# Patient Record
Sex: FEMALE | Race: OTHER | HISPANIC OR LATINO | URBAN - METROPOLITAN AREA
[De-identification: names, ages, dates, MRNs, and addresses within clinical notes are randomized per-mention and may not be internally consistent; named-entity substitution may affect disease eponyms.]

---

## 2023-10-26 ENCOUNTER — EMERGENCY (EMERGENCY)
Facility: HOSPITAL | Age: 23
LOS: 1 days | Discharge: HOME | End: 2023-10-26
Attending: EMERGENCY MEDICINE | Admitting: EMERGENCY MEDICINE
Payer: SELF-PAY

## 2023-10-26 VITALS
HEART RATE: 94 BPM | SYSTOLIC BLOOD PRESSURE: 100 MMHG | TEMPERATURE: 99 F | RESPIRATION RATE: 16 BRPM | DIASTOLIC BLOOD PRESSURE: 55 MMHG | OXYGEN SATURATION: 97 %

## 2023-10-26 PROCEDURE — 99053 MED SERV 10PM-8AM 24 HR FAC: CPT

## 2023-10-26 PROCEDURE — 99284 EMERGENCY DEPT VISIT MOD MDM: CPT

## 2023-10-26 PROCEDURE — 93010 ELECTROCARDIOGRAM REPORT: CPT

## 2023-10-26 NOTE — ED ADULT TRIAGE NOTE - CHIEF COMPLAINT QUOTE
pt c/o palpitations x 4 days. also c/o abdominal pain, n/v, cough, chills, chest pain, headache and dizziness beginning 1 week ago. appears to be in no distress. hx. asthma

## 2023-10-27 VITALS
RESPIRATION RATE: 21 BRPM | HEART RATE: 72 BPM | TEMPERATURE: 98 F | DIASTOLIC BLOOD PRESSURE: 62 MMHG | SYSTOLIC BLOOD PRESSURE: 101 MMHG | OXYGEN SATURATION: 100 %

## 2023-10-27 LAB
APPEARANCE UR: ABNORMAL
APPEARANCE UR: ABNORMAL
BACTERIA # UR AUTO: ABNORMAL /HPF
BACTERIA # UR AUTO: ABNORMAL /HPF
BILIRUB UR-MCNC: NEGATIVE — SIGNIFICANT CHANGE UP
BILIRUB UR-MCNC: NEGATIVE — SIGNIFICANT CHANGE UP
CAST: 1 /LPF — SIGNIFICANT CHANGE UP (ref 0–4)
CAST: 1 /LPF — SIGNIFICANT CHANGE UP (ref 0–4)
COLOR SPEC: SIGNIFICANT CHANGE UP
COLOR SPEC: SIGNIFICANT CHANGE UP
DIFF PNL FLD: NEGATIVE — SIGNIFICANT CHANGE UP
DIFF PNL FLD: NEGATIVE — SIGNIFICANT CHANGE UP
FLUAV AG NPH QL: SIGNIFICANT CHANGE UP
FLUAV AG NPH QL: SIGNIFICANT CHANGE UP
FLUBV AG NPH QL: SIGNIFICANT CHANGE UP
FLUBV AG NPH QL: SIGNIFICANT CHANGE UP
GLUCOSE UR QL: NEGATIVE MG/DL — SIGNIFICANT CHANGE UP
GLUCOSE UR QL: NEGATIVE MG/DL — SIGNIFICANT CHANGE UP
KETONES UR-MCNC: 80 MG/DL
KETONES UR-MCNC: 80 MG/DL
LEUKOCYTE ESTERASE UR-ACNC: NEGATIVE — SIGNIFICANT CHANGE UP
LEUKOCYTE ESTERASE UR-ACNC: NEGATIVE — SIGNIFICANT CHANGE UP
NITRITE UR-MCNC: NEGATIVE — SIGNIFICANT CHANGE UP
NITRITE UR-MCNC: NEGATIVE — SIGNIFICANT CHANGE UP
PH UR: 6 — SIGNIFICANT CHANGE UP (ref 5–8)
PH UR: 6 — SIGNIFICANT CHANGE UP (ref 5–8)
PROT UR-MCNC: 30 MG/DL
PROT UR-MCNC: 30 MG/DL
RBC CASTS # UR COMP ASSIST: 3 /HPF — SIGNIFICANT CHANGE UP (ref 0–4)
RBC CASTS # UR COMP ASSIST: 3 /HPF — SIGNIFICANT CHANGE UP (ref 0–4)
RSV RNA NPH QL NAA+NON-PROBE: SIGNIFICANT CHANGE UP
RSV RNA NPH QL NAA+NON-PROBE: SIGNIFICANT CHANGE UP
SARS-COV-2 RNA SPEC QL NAA+PROBE: SIGNIFICANT CHANGE UP
SARS-COV-2 RNA SPEC QL NAA+PROBE: SIGNIFICANT CHANGE UP
SP GR SPEC: 1.04 — HIGH (ref 1–1.03)
SP GR SPEC: 1.04 — HIGH (ref 1–1.03)
SQUAMOUS # UR AUTO: 8 /HPF — HIGH (ref 0–5)
SQUAMOUS # UR AUTO: 8 /HPF — HIGH (ref 0–5)
UROBILINOGEN FLD QL: 1 MG/DL — SIGNIFICANT CHANGE UP (ref 0.2–1)
UROBILINOGEN FLD QL: 1 MG/DL — SIGNIFICANT CHANGE UP (ref 0.2–1)
WBC UR QL: 3 /HPF — SIGNIFICANT CHANGE UP (ref 0–5)
WBC UR QL: 3 /HPF — SIGNIFICANT CHANGE UP (ref 0–5)

## 2023-10-27 RX ORDER — ACETAMINOPHEN 500 MG
650 TABLET ORAL ONCE
Refills: 0 | Status: COMPLETED | OUTPATIENT
Start: 2023-10-27 | End: 2023-10-27

## 2023-10-27 RX ORDER — ONDANSETRON 8 MG/1
4 TABLET, FILM COATED ORAL ONCE
Refills: 0 | Status: DISCONTINUED | OUTPATIENT
Start: 2023-10-27 | End: 2023-10-27

## 2023-10-27 RX ORDER — ONDANSETRON 8 MG/1
4 TABLET, FILM COATED ORAL ONCE
Refills: 0 | Status: COMPLETED | OUTPATIENT
Start: 2023-10-27 | End: 2023-10-27

## 2023-10-27 RX ORDER — ALBUTEROL 90 UG/1
2 AEROSOL, METERED ORAL ONCE
Refills: 0 | Status: COMPLETED | OUTPATIENT
Start: 2023-10-27 | End: 2023-10-27

## 2023-10-27 RX ORDER — ONDANSETRON 8 MG/1
1 TABLET, FILM COATED ORAL
Qty: 15 | Refills: 0
Start: 2023-10-27 | End: 2023-10-31

## 2023-10-27 RX ORDER — IBUPROFEN 200 MG
400 TABLET ORAL ONCE
Refills: 0 | Status: COMPLETED | OUTPATIENT
Start: 2023-10-27 | End: 2023-10-27

## 2023-10-27 RX ADMIN — ONDANSETRON 4 MILLIGRAM(S): 8 TABLET, FILM COATED ORAL at 01:54

## 2023-10-27 RX ADMIN — ALBUTEROL 2 PUFF(S): 90 AEROSOL, METERED ORAL at 01:53

## 2023-10-27 RX ADMIN — Medication 650 MILLIGRAM(S): at 01:52

## 2023-10-27 RX ADMIN — Medication 400 MILLIGRAM(S): at 01:53

## 2023-10-27 NOTE — ED ADULT NURSE NOTE - OBJECTIVE STATEMENT
22 year old female, received to spot 24A. Pt A&Ox4, ambulatory. Respirations equal and unlabored. Past medical history of asthma. Pt c/o palpitations x4 days. Pt states she has had a cough x3 weeks with intermittent dizziness, SOB, and nausea. Pt denies chest pain, blurry vision, headache, numbness, tingling, any other complaints. Neuro intact. PERRLA. Skin dry and intact. Pt placed on cardiac monitor, normal sinus. EKG performed and placed in chart. UCG performed. Medicated as per EMR orders. No acute distress noted. Pending lab results.

## 2023-10-27 NOTE — ED PROVIDER NOTE - NSFOLLOWUPINSTRUCTIONS_ED_ALL_ED_FT
You were seen in the Emergency Department for palpitations, nausea vomiting, diarrhea.  Urine did not show an acute UTI.  You were also negative for flu, COVID, RSV.  It is likely that your symptoms are still due to a viral illness.  Please follow-up with your primary care doctor in a week.  You can use your inhaler as needed for difficulty breathing.    1) Advance activity as tolerated.   2) Continue all previously prescribed medications as directed.    3) Follow up with your primary care physician in a week- take copies of your results.    4) Return to the Emergency Department for worsening or persistent symptoms, and/or ANY NEW OR CONCERNING SYMPTOMS.    Lo atendieron en Urgencias por palpitaciones, náuseas, vómitos, diarrea. La orina no mostró nilda ITU aguda. También resultó negativo para gripe, COVID, RSV. Es probable que michelle síntomas aún se deban a nilda enfermedad viral. Flori un seguimiento con mills médico de atención primaria en nilda semana. Puede usar mills inhalador según sea necesario si tiene dificultad para respirar.    1) Avanzar en la actividad según se tolere.  2) Continúe con todos los medicamentos recetados anteriormente según las indicaciones.  3) Flori un seguimiento con mills médico de atención primaria en nilda semana; tome copias de michelle resultados.  4) Regresar al Departamento de Emergencias si los síntomas empeoran o persisten, y/o CUALQUIER SÍNTOMA NUEVO O PREOCUPANTE.

## 2023-10-27 NOTE — ED ADULT NURSE NOTE - NSFALLUNIVINTERV_ED_ALL_ED
Bed/Stretcher in lowest position, wheels locked, appropriate side rails in place/Call bell, personal items and telephone in reach/Instruct patient to call for assistance before getting out of bed/chair/stretcher/Non-slip footwear applied when patient is off stretcher/[place] to call system/Physically safe environment - no spills, clutter or unnecessary equipment/Purposeful proactive rounding/Room/bathroom lighting operational, light cord in reach

## 2023-10-27 NOTE — ED PROVIDER NOTE - ATTENDING CONTRIBUTION TO CARE
22-year-old female history of asthma, normal steroids presenting with 4 days of subjective fevers, cough, nausea, abdominal discomfort, vomiting and diarrhea.  Patient reports generalized abdominal discomfort, worse with episodes of vomiting.  Also with nonbloody diarrhea.  Cough is productive of yellow phlegm, but no associated chest pain or shortness of breath.  No recent sick contacts or known travel.    Well appearing, lying in stretcher, awake and alert, nontoxic.  AF, VSS.  Dry mucosa, neck supple.  Lungs cta bl scattered exp wheeze, no increased work of breathing.  Cards nl S1/S2, RRR, no MRG.  Abd soft ntnd no rebound or guarding.  No pedal edema or calf tenderness.    Patient with likely underlying viral illness.  Abdominal exam is benign, no peritoneal signs.  No indication for emergent cross-sectional imaging at this time.  Will treat supportively, hydrate, check electrolytes given vomiting and diarrhea, reassess.

## 2023-10-27 NOTE — ED PROVIDER NOTE - PATIENT PORTAL LINK FT
You can access the FollowMyHealth Patient Portal offered by [facility] by registering at the following website: [email or web address]. By joining [employer]’s FollowMyHealth portal, you will also be able to view your health information using other applications (apps) compatible with our system.

## 2023-10-27 NOTE — ED PROVIDER NOTE - CLINICAL SUMMARY MEDICAL DECISION MAKING FREE TEXT BOX
HPI:  22-year-old female with past medical history asthma (last hospitalized at age 19, never intubated), presenting with palpitations, fevers, cough, nausea and vomiting, diarrhea, abdominal pain for the past 4 days.  Patient states that she has been having productive cough with yellow phlegm for the past 4 days.  Patient has headaches associated with her fevers.  Patient has also been having nausea vomiting and diarrhea for the past 4 days, having difficulty keeping food down.  Denies any recent sick contacts/travel.  Patient did not attempt to use her albuterol at home, does state that she feels like she might need at this time.  Patient also reports some dysuria.    ROS:  Negative except as noted in HPI    Physical exam:  Const: not in acute distress  Eyes: no conjunctival injection  HEENT: Head NCAT, Moist MM.  Neck: Trachea midline.  No Brudzinski sign.  CVS: +S1/S2, Peripheral pulses 2+ and equal in all extremities.  RESP: Unlabored respiratory effort. Coarse expiratory breath sounds.  GI: Nontender/Nondistended, No CVA tenderness b/l.   MSK: Normocephalic/Atraumatic, No Lower Extremities edema b/l.   Skin: Intact.   Neuro: Motor & Sensation grossly intact.  Psych: Awake, Alert, & Cooperative    MDM:  22-year-old female with past medical history asthma (last hospitalized at age 19, never intubated), presenting with palpitations, fevers, cough, nausea and vomiting, diarrhea, abdominal pain for the past 4 days.  Hemodynamically stable, temperature at 99.  Physical exam with heart regular rate and rhythm, lungs with some coarse expiratory breath sounds/minimal wheezing, abdomen soft nondistended nontender.    The differential diagnosis includes but is not limited to most likely illness, UTI, asthma exacerbation.    Will obtain flu with COVID, UA/UC, urine pregnancy test, EKG.  Will treat with albuterol inhaler, Tylenol, Motrin, Zofran.

## 2023-10-27 NOTE — ED PROVIDER NOTE - PROGRESS NOTE DETAILS
MD Yarelis (PGY-2) patient reassessed.  Patient reports feeling better.  Lungs now clear to auscultation.  Patient results reviewed.  No acute findings.  UA with moderate bacteria but also elevated epithelial cells.  We will follow-up on urine culture.  Patient's urine pregnancy test was negative.  Patient was also negative for COVID and flu.  Patient likely has a viral illness.  Patient was able to tolerate p.o. in the ED.  Discussed with pt results of work up, return precautions and follow-up.  Pt expressed understanding and agrees with plan.

## 2023-10-28 LAB
CULTURE RESULTS: SIGNIFICANT CHANGE UP
CULTURE RESULTS: SIGNIFICANT CHANGE UP
SPECIMEN SOURCE: SIGNIFICANT CHANGE UP
SPECIMEN SOURCE: SIGNIFICANT CHANGE UP

## 2024-10-16 NOTE — ED PROVIDER NOTE - DATE/TIME 1
Sent message to pt in [facility] informing her that her appt has been rescheduled for OB grzegorz. New appt date is 10/21 @730am virtual.    27-Oct-2023 03:16

## 2024-11-10 ENCOUNTER — EMERGENCY (EMERGENCY)
Facility: HOSPITAL | Age: 24
LOS: 1 days | Discharge: HOME | End: 2024-11-10
Attending: STUDENT IN AN ORGANIZED HEALTH CARE EDUCATION/TRAINING PROGRAM | Admitting: STUDENT IN AN ORGANIZED HEALTH CARE EDUCATION/TRAINING PROGRAM
Payer: SELF-PAY

## 2024-11-10 VITALS
RESPIRATION RATE: 18 BRPM | OXYGEN SATURATION: 99 % | SYSTOLIC BLOOD PRESSURE: 110 MMHG | WEIGHT: 149.91 LBS | HEART RATE: 85 BPM | HEIGHT: 60 IN | TEMPERATURE: 98 F | DIASTOLIC BLOOD PRESSURE: 65 MMHG

## 2024-11-10 VITALS
HEART RATE: 60 BPM | RESPIRATION RATE: 18 BRPM | OXYGEN SATURATION: 99 % | DIASTOLIC BLOOD PRESSURE: 63 MMHG | TEMPERATURE: 98 F | SYSTOLIC BLOOD PRESSURE: 98 MMHG

## 2024-11-10 PROCEDURE — 99283 EMERGENCY DEPT VISIT LOW MDM: CPT

## 2024-11-10 NOTE — ED PROVIDER NOTE - OBJECTIVE STATEMENT
23-year-old female with past medical history of asthma presents emergency room for evaluation of a lump to the left side of her neck x 5 days.  Patient states the lump started a small pimple on the left side of her neck and got bigger 2 days after an seen size since.  Patient states it hurts to the touch and exacerbates with movement of the neck.  Denies fever, chills, or recent traveling.  Patient states initially she noted a small bump while she was combing her hair.  Denies prior history of this anywhere in the body.  Denies fever, chills, night sweats, cough.     ID number [id number]

## 2024-11-10 NOTE — ED ADULT NURSE NOTE - OBJECTIVE STATEMENT
Patient received in wellness, exam room 4. Patient A&Ox3 and ambulatory at baseline. Patient presents to the ED c/o left side neck pain. Patient denies significant phx. Patient denies headache, dizziness, lightheadedness, nausea/vomiting, fever/chills, and pain. Patient offers no complaints at this time. VSS, respirations even and unlabored, no signs/symptoms of acute distress; patient denies dyspnea, shortness of breath, and chest pain. Patient is stable at this time. Steady gait observed.

## 2024-11-10 NOTE — ED PROVIDER NOTE - ATTENDING APP SHARED VISIT CONTRIBUTION OF CARE
22 yo F who presents to the ED c/o lump to the back of her head that she first noted about 5 days ago. She has no pain at rest but reports if she moves her head suddenly she feels some pain to that area. No recent trauma to the area. No systemic symptoms. On exam, there is no overlying skin changes, no fluctuance or induration. Low concern for infection, does not feel like lipoma. Less likely lymphadenopathy with no systemic symptoms or recent illness. no need for emergent work-up. Will refer to primary care and dermatology

## 2024-11-10 NOTE — ED PROVIDER NOTE - NSFOLLOWUPINSTRUCTIONS_ED_ALL_ED_FT
Seguimiento con médico de atención primaria y clínica de Dermatología.   Regrese a la veronika de emergencias si los síntomas empeoran o no mejoran.

## 2024-11-10 NOTE — ED ADULT TRIAGE NOTE - CHIEF COMPLAINT QUOTE
pt c/o of painful lump to lt sided neck for few days, pt denies any airway issues, denies recent travel

## 2024-11-10 NOTE — ED PROVIDER NOTE - PHYSICAL EXAMINATION
Neck is supple.   There is a 2 x 2 cm oval marginated mass to the posterior left side of the neck.  No skin changes.  Minimal tenderness.  No fluctuance or induration noted.

## 2024-11-10 NOTE — ED PROVIDER NOTE - CLINICAL SUMMARY MEDICAL DECISION MAKING FREE TEXT BOX
23-year-old female presents emergency room with left posterior mass.  No sign of infection.  There is no fluctuance or induration to think that this may be an abscess.  No recent travels or recent URI.  Less likely lymphadenopathy.  Will refer patient to the dermatology clinic and her primary care doctor office.  Will discharge patient at this time with strict return precautions.  Patient agreeable with the plan

## 2024-11-11 PROBLEM — J45.909 UNSPECIFIED ASTHMA, UNCOMPLICATED: Chronic | Status: ACTIVE | Noted: 2023-10-27

## 2025-02-13 NOTE — ED ADULT NURSE NOTE - NS_NURSE_DISC_TEACHING_YN_ED_ALL_ED
Notified patient's daughter, Jacque, of recommendations.  She verbalized understanding.  Medication updated in medical record.  Jacque says patient stopped Dexcom because she was not carrying the .  She thinks she has some sensors at home and will try to restart the Dexcom.  Instructed her to contact the office with additional questions or concerns.    Yes

## 2025-03-29 NOTE — ED PROVIDER NOTE - PATIENT PORTAL LINK FT
You can access the FollowMyHealth Patient Portal offered by [facility] by registering at the following website: [email or web address]. By joining [employer]’s FollowMyHealth portal, you will also be able to view your health information using other applications (apps) compatible with our system.
Patient